# Patient Record
Sex: FEMALE | Race: WHITE | Employment: FULL TIME | URBAN - METROPOLITAN AREA
[De-identification: names, ages, dates, MRNs, and addresses within clinical notes are randomized per-mention and may not be internally consistent; named-entity substitution may affect disease eponyms.]

---

## 2019-01-10 ENCOUNTER — HOSPITAL ENCOUNTER (OUTPATIENT)
Dept: PHYSICAL THERAPY | Age: 19
Setting detail: THERAPIES SERIES
Discharge: HOME | End: 2019-01-10
Payer: COMMERCIAL

## 2019-01-10 PROCEDURE — 97161 PT EVAL LOW COMPLEX 20 MIN: CPT

## 2019-01-10 PROCEDURE — 97110 THERAPEUTIC EXERCISES: CPT

## 2019-01-10 PROCEDURE — 97140 MANUAL THERAPY 1/> REGIONS: CPT

## 2019-01-10 ASSESSMENT — PAIN DESCRIPTION - LOCATION: LOCATION: NECK

## 2019-01-10 ASSESSMENT — PAIN DESCRIPTION - PROGRESSION: CLINICAL_PROGRESSION: GRADUALLY WORSENING

## 2019-01-10 ASSESSMENT — PAIN - FUNCTIONAL ASSESSMENT: PAIN_FUNCTIONAL_ASSESSMENT: PREVENTS OR INTERFERES WITH ALL ACTIVE AND SOME PASSIVE ACTIVITIES

## 2019-01-10 ASSESSMENT — PAIN DESCRIPTION - ORIENTATION: ORIENTATION: RIGHT

## 2019-01-10 ASSESSMENT — PAIN DESCRIPTION - DESCRIPTORS: DESCRIPTORS: BURNING;SHOOTING

## 2019-01-14 ENCOUNTER — HOSPITAL ENCOUNTER (OUTPATIENT)
Dept: PHYSICAL THERAPY | Age: 19
Setting detail: THERAPIES SERIES
Discharge: HOME | End: 2019-01-14
Payer: COMMERCIAL

## 2019-01-14 PROCEDURE — 97140 MANUAL THERAPY 1/> REGIONS: CPT

## 2019-01-14 PROCEDURE — 97110 THERAPEUTIC EXERCISES: CPT

## 2019-01-17 ENCOUNTER — HOSPITAL ENCOUNTER (OUTPATIENT)
Dept: PHYSICAL THERAPY | Age: 19
Setting detail: THERAPIES SERIES
Discharge: HOME | End: 2019-01-17
Payer: COMMERCIAL

## 2019-01-17 PROCEDURE — 97140 MANUAL THERAPY 1/> REGIONS: CPT

## 2019-01-17 PROCEDURE — 97110 THERAPEUTIC EXERCISES: CPT

## 2019-01-21 ENCOUNTER — HOSPITAL ENCOUNTER (OUTPATIENT)
Dept: PHYSICAL THERAPY | Age: 19
Setting detail: THERAPIES SERIES
Discharge: HOME | End: 2019-01-21
Payer: COMMERCIAL

## 2019-01-21 PROCEDURE — 97140 MANUAL THERAPY 1/> REGIONS: CPT

## 2019-01-21 PROCEDURE — 97110 THERAPEUTIC EXERCISES: CPT

## 2019-01-24 ENCOUNTER — HOSPITAL ENCOUNTER (OUTPATIENT)
Dept: PHYSICAL THERAPY | Age: 19
Setting detail: THERAPIES SERIES
Discharge: HOME | End: 2019-01-24
Payer: COMMERCIAL

## 2019-01-29 ENCOUNTER — HOSPITAL ENCOUNTER (OUTPATIENT)
Dept: PHYSICAL THERAPY | Age: 19
Setting detail: THERAPIES SERIES
Discharge: HOME | End: 2019-01-29
Payer: COMMERCIAL

## 2019-01-29 PROCEDURE — 97110 THERAPEUTIC EXERCISES: CPT

## 2019-01-29 PROCEDURE — 97140 MANUAL THERAPY 1/> REGIONS: CPT

## 2019-01-31 ENCOUNTER — HOSPITAL ENCOUNTER (OUTPATIENT)
Dept: PHYSICAL THERAPY | Age: 19
Setting detail: THERAPIES SERIES
Discharge: HOME | End: 2019-01-31
Payer: COMMERCIAL

## 2019-02-05 ENCOUNTER — HOSPITAL ENCOUNTER (OUTPATIENT)
Dept: PHYSICAL THERAPY | Age: 19
Setting detail: THERAPIES SERIES
Discharge: HOME | End: 2019-02-05
Payer: COMMERCIAL

## 2019-02-05 PROCEDURE — 97140 MANUAL THERAPY 1/> REGIONS: CPT

## 2019-02-05 PROCEDURE — 97110 THERAPEUTIC EXERCISES: CPT

## 2019-02-11 ENCOUNTER — HOSPITAL ENCOUNTER (OUTPATIENT)
Dept: PHYSICAL THERAPY | Age: 19
Setting detail: THERAPIES SERIES
Discharge: HOME | End: 2019-02-11
Payer: COMMERCIAL

## 2019-02-11 PROCEDURE — 97140 MANUAL THERAPY 1/> REGIONS: CPT

## 2019-02-11 PROCEDURE — 97110 THERAPEUTIC EXERCISES: CPT

## 2019-02-18 ENCOUNTER — HOSPITAL ENCOUNTER (OUTPATIENT)
Dept: PHYSICAL THERAPY | Age: 19
Setting detail: THERAPIES SERIES
Discharge: HOME | End: 2019-02-18
Payer: COMMERCIAL

## 2019-02-18 PROCEDURE — 97140 MANUAL THERAPY 1/> REGIONS: CPT

## 2019-02-18 PROCEDURE — 97110 THERAPEUTIC EXERCISES: CPT

## 2019-02-20 ENCOUNTER — HOSPITAL ENCOUNTER (OUTPATIENT)
Dept: PHYSICAL THERAPY | Age: 19
Setting detail: THERAPIES SERIES
Discharge: HOME | End: 2019-02-20
Payer: COMMERCIAL

## 2019-02-20 PROCEDURE — 97110 THERAPEUTIC EXERCISES: CPT

## 2019-02-20 PROCEDURE — 97140 MANUAL THERAPY 1/> REGIONS: CPT

## 2019-02-25 ENCOUNTER — HOSPITAL ENCOUNTER (OUTPATIENT)
Dept: PHYSICAL THERAPY | Age: 19
Setting detail: THERAPIES SERIES
Discharge: HOME | End: 2019-02-25
Payer: COMMERCIAL

## 2019-02-25 PROCEDURE — 97110 THERAPEUTIC EXERCISES: CPT

## 2019-02-25 PROCEDURE — 97140 MANUAL THERAPY 1/> REGIONS: CPT

## 2019-03-06 ENCOUNTER — HOSPITAL ENCOUNTER (OUTPATIENT)
Dept: PHYSICAL THERAPY | Age: 19
Setting detail: THERAPIES SERIES
Discharge: HOME | End: 2019-03-06
Payer: COMMERCIAL

## 2019-03-06 PROCEDURE — 97140 MANUAL THERAPY 1/> REGIONS: CPT

## 2019-03-06 PROCEDURE — 97110 THERAPEUTIC EXERCISES: CPT

## 2019-03-11 ENCOUNTER — HOSPITAL ENCOUNTER (OUTPATIENT)
Dept: PHYSICAL THERAPY | Age: 19
Setting detail: THERAPIES SERIES
Discharge: HOME | End: 2019-03-11
Payer: COMMERCIAL

## 2019-03-11 PROCEDURE — 97140 MANUAL THERAPY 1/> REGIONS: CPT

## 2019-03-11 PROCEDURE — 97110 THERAPEUTIC EXERCISES: CPT

## 2019-03-20 ENCOUNTER — HOSPITAL ENCOUNTER (OUTPATIENT)
Dept: PHYSICAL THERAPY | Age: 19
Setting detail: THERAPIES SERIES
Discharge: HOME | End: 2019-03-20
Payer: COMMERCIAL

## 2019-03-20 PROCEDURE — 97110 THERAPEUTIC EXERCISES: CPT

## 2019-03-20 PROCEDURE — 97140 MANUAL THERAPY 1/> REGIONS: CPT

## 2019-06-11 ENCOUNTER — HOSPITAL ENCOUNTER (EMERGENCY)
Age: 19
Discharge: HOME | End: 2019-06-11
Attending: EMERGENCY MEDICINE
Payer: COMMERCIAL

## 2019-06-11 VITALS
RESPIRATION RATE: 17 BRPM | TEMPERATURE: 97.6 F | DIASTOLIC BLOOD PRESSURE: 57 MMHG | OXYGEN SATURATION: 100 % | HEIGHT: 63 IN | SYSTOLIC BLOOD PRESSURE: 123 MMHG | WEIGHT: 154.98 LBS | HEART RATE: 78 BPM | BODY MASS INDEX: 27.46 KG/M2

## 2019-06-11 DIAGNOSIS — R51.9 HEADACHE DISORDER: Primary | ICD-10-CM

## 2019-06-11 PROCEDURE — 96374 THER/PROPH/DIAG INJ IV PUSH: CPT

## 2019-06-11 PROCEDURE — 2580000003 HC RX 258: Performed by: EMERGENCY MEDICINE

## 2019-06-11 PROCEDURE — 96375 TX/PRO/DX INJ NEW DRUG ADDON: CPT

## 2019-06-11 PROCEDURE — 6360000002 HC RX W HCPCS: Performed by: EMERGENCY MEDICINE

## 2019-06-11 PROCEDURE — 96361 HYDRATE IV INFUSION ADD-ON: CPT

## 2019-06-11 PROCEDURE — 99283 EMERGENCY DEPT VISIT LOW MDM: CPT

## 2019-06-11 RX ORDER — DIPHENHYDRAMINE HYDROCHLORIDE 50 MG/ML
25 INJECTION INTRAMUSCULAR; INTRAVENOUS ONCE
Status: COMPLETED | OUTPATIENT
Start: 2019-06-11 | End: 2019-06-11

## 2019-06-11 RX ORDER — PROCHLORPERAZINE EDISYLATE 5 MG/ML
10 INJECTION INTRAMUSCULAR; INTRAVENOUS ONCE
Status: COMPLETED | OUTPATIENT
Start: 2019-06-11 | End: 2019-06-11

## 2019-06-11 RX ORDER — 0.9 % SODIUM CHLORIDE 0.9 %
1000 INTRAVENOUS SOLUTION INTRAVENOUS ONCE
Status: COMPLETED | OUTPATIENT
Start: 2019-06-11 | End: 2019-06-11

## 2019-06-11 RX ADMIN — SODIUM CHLORIDE 1000 ML: 9 INJECTION, SOLUTION INTRAVENOUS at 19:44

## 2019-06-11 RX ADMIN — DIPHENHYDRAMINE HYDROCHLORIDE 25 MG: 50 INJECTION, SOLUTION INTRAMUSCULAR; INTRAVENOUS at 19:44

## 2019-06-11 RX ADMIN — PROCHLORPERAZINE EDISYLATE 10 MG: 5 INJECTION INTRAMUSCULAR; INTRAVENOUS at 19:45

## 2019-06-11 ASSESSMENT — PAIN DESCRIPTION - LOCATION
LOCATION: HEAD

## 2019-06-11 ASSESSMENT — PAIN DESCRIPTION - PAIN TYPE
TYPE: ACUTE PAIN

## 2019-06-11 ASSESSMENT — PAIN DESCRIPTION - PROGRESSION
CLINICAL_PROGRESSION: GRADUALLY IMPROVING
CLINICAL_PROGRESSION: NOT CHANGED
CLINICAL_PROGRESSION: GRADUALLY IMPROVING

## 2019-06-11 ASSESSMENT — PAIN DESCRIPTION - DESCRIPTORS
DESCRIPTORS: THROBBING
DESCRIPTORS: STABBING;THROBBING

## 2019-06-11 ASSESSMENT — PAIN DESCRIPTION - FREQUENCY
FREQUENCY: CONTINUOUS
FREQUENCY: CONTINUOUS

## 2019-06-11 ASSESSMENT — PAIN DESCRIPTION - ONSET: ONSET: PROGRESSIVE

## 2019-06-11 ASSESSMENT — PAIN SCALES - GENERAL
PAINLEVEL_OUTOF10: 5
PAINLEVEL_OUTOF10: 8
PAINLEVEL_OUTOF10: 6

## 2019-06-11 ASSESSMENT — PAIN - FUNCTIONAL ASSESSMENT: PAIN_FUNCTIONAL_ASSESSMENT: PREVENTS OR INTERFERES SOME ACTIVE ACTIVITIES AND ADLS

## 2019-06-11 ASSESSMENT — PAIN DESCRIPTION - DIRECTION
RADIATING_TOWARDS: NON RADIATING
RADIATING_TOWARDS: NON RADIATING

## 2019-06-11 ASSESSMENT — PAIN DESCRIPTION - ORIENTATION
ORIENTATION: OTHER (COMMENT)
ORIENTATION: RIGHT;OTHER (COMMENT)

## 2019-06-11 NOTE — ED PROVIDER NOTES
eMERGENCY dEPARTMENT eNCOUnter      [facility]    Chief Complaint   Patient presents with    Headache     patient states migraine since 10 am, also with nausea & vomiting associated with it. Took ibuprofen for it. OPHELIA De Los Santos is a 20 y.o. female who presents with headache that started at 10:00 this morning. Patient has a history of chronic recurrent headaches that she calls migraines. She has seen a neurologist in the past.  She normally takes ibuprofen which seems to help but it did not help today. She has nausea and vomiting associated with it as well. She is never had any type of cancer, no trauma, no fever or neck stiffness, and no recent travel outside the [facility]. She never had a intracranial injury or surgery and she is not anticoagulated. She states that the headache came on gradually and then got progressively worse and it is global and it is worse in with light and opening her eyes. She states she has some numbness around her mouth as well which is typical.    PAST MEDICAL HISTORY    Past Medical History:   Diagnosis Date    Bipolar 1 disorder ([facility] [address]     Hx of blood clots     after achilles tendon surgery    Intermittent explosive disorder     Memory loss     forgetful, poor memory, short and long term     Mental disorder     Bipolar/Adhd/ IDD    Migraine     Migraines     Osteopenia     Sleep apnea     prior to tonsillectomy and adnoidectomy at age 1    Vitamin D deficiency        SURGICAL HISTORY    Past Surgical History:   Procedure Laterality Date    ACHILLES TENDON SURGERY Bilateral     ANKLE SURGERY Right Rt x 4 total surgeries and Lt x2 surgeries     Right ankle removed scar tissue and later had to have the hardware removed and replaced.       ANKLE SURGERY  2013    x3    OTHER SURGICAL HISTORY Bilateral     Bilateral ankle implants     OTHER SURGICAL HISTORY Right 2/2013    Right arm surgery to repair s/p fracture w/ plate and pin to reconstruct inadequate healing     OTHER SURGICAL HISTORY  4/26/13    REMOVAL OF DEEP HARDWARE/FOREIGN BODY BILATERAL FEET    OTHER SURGICAL HISTORY      plates and pins placed/remover 2014 in R arm     TONSILLECTOMY      TONSILLECTOMY AND ADENOIDECTOMY         CURRENT MEDICATIONS    Current Outpatient Rx   Medication Sig Dispense Refill    ibuprofen (ADVIL;MOTRIN) 600 MG tablet Take 1 tablet by mouth every 6 hours as needed for Pain 60 tablet 0    ibuprofen (ADVIL;MOTRIN) 600 MG tablet Take 600 mg by mouth every 6 hours as needed. Indications: migraines and arm pain      amphetamine-dextroamphetamine (ADDERALL XR) 25 MG XR capsule Take 25 mg by mouth every morning.  divalproex (DEPAKOTE ER) 250 MG ER tablet Take 750 mg by mouth 2 times daily. Indications: migraines      Melatonin 3 MG TABS Take 3 mg by mouth daily. Takes about 3-4 times per wk to assist with sleep      SUMAtriptan (IMITREX) 100 MG tablet Take 100 mg by mouth once as needed. Indications: migraines      multivitamin (THERAGRAN) per tablet Take 1 tablet by mouth daily.          ALLERGIES    Allergies   Allergen Reactions    No Known Allergies      Other reaction(s):    NO KNOWN DRUG ALLERGIES       FAMILY HISTORY    Family History   Problem Relation Age of Onset    Arthritis Mother     Asthma Mother     Diabetes Mother     High Cholesterol Mother     Depression Sister     Mental Illness Sister     High Blood Pressure Maternal Aunt     Diabetes Maternal Grandmother     Heart Disease Maternal Grandmother     High Blood Pressure Maternal Grandmother     High Cholesterol Maternal Grandmother     Arthritis Maternal Grandmother     Diabetes Paternal Grandmother     Kidney Disease Paternal Grandmother     Hearing Loss Paternal Grandmother     Heart Disease Paternal Grandmother     High Blood Pressure Paternal Grandmother     Arthritis Paternal Grandfather     Diabetes Paternal Grandfather     Heart Disease Paternal Justin Persons High Blood Pressure Paternal Grandfather     Kidney Disease Paternal Grandfather     Heart Disease Maternal Cousin     Other Maternal Cousin     Birth Defects Paternal Cousin        SOCIAL HISTORY    Social History     Socioeconomic History    Marital status: Single     Spouse name: None    Number of children: None    Years of education: None    Highest education level: None   Occupational History    None   Social Needs    Financial resource strain: None    Food insecurity:     Worry: None     Inability: None    Transportation needs:     Medical: None     Non-medical: None   Tobacco Use    Smoking status: Never Smoker    Smokeless tobacco: Never Used   Substance and Sexual Activity    Alcohol use: No    Drug use: No    Sexual activity: Never     Partners: Male   Lifestyle    Physical activity:     Days per week: None     Minutes per session: None    Stress: None   Relationships    Social connections:     Talks on phone: None     Gets together: None     Attends [employer] service: None     Active member of club or organization: None     Attends meetings of clubs or organizations: None     Relationship status: None    Intimate partner violence:     Fear of current or ex partner: None     Emotionally abused: None     Physically abused: None     Forced sexual activity: None   Other Topics Concern    None   Social History Narrative    ** Merged History Encounter **            REVIEW OF SYSTEMS    Constitutional:  Denies fever, chills, weight loss or weakness   Eyes:  Denies photophobia or discharge   HENT:  Denies sore throat or ear pain   Respiratory:  Denies cough or shortness of breath   Cardiovascular:  Denies chest pain, palpitations or swelling   GI:  Denies abdominal pain, nausea, vomiting, or diarrhea   Musculoskeletal:  Denies back pain   Skin:  Denies rash   Neurologic: Headache   endocrine:  Denies polyuria or polydypsia   Lymphatic:  Denies swollen glands   Psychiatric:  Denies patient  After treatment, she stated that the pain was almost completely gone. She is awake and alert with no focal neurologic deficit. I think that she can follow-up with her neurologist or at the very least primary care. Return for worsening headache or weakness in extremities or fever of 101 or greater. She voices understanding  FINAL IMPRESSION    1.  Headache disorder             Tahira Case MD  06/11/19 2047

## 2019-06-11 NOTE — ED TRIAGE NOTES
Patient presents as walk in patient with her mother with c/o headache, nausea and vomiting that started around 10:00 today. Patient vomiting during triage assessment. Patient's mother states patient had history of migraines as a child, but has not had one for several years. Patient took ibuprofen around 12:00, then again at 6:00 but vomited immediately following 6:00 dose. Patient also states feeling numbness and tingling in right arm and mouth and ringing in ears. Also c/o light sensitivity. Patient is awake, alert, oriented, respirations easy & regular, skin w/d, MMM & pink, cap refill brisk. Patient's mother answered most of patient's triage intake. Side rails up on bed, call light near, lights in room dimmed. Dr. Maria Alejandra Tamez in to see patient.

## 2019-06-12 NOTE — ED NOTES
Patient resting quietly in darkened room, states pain is starting to get better. Denies further episodes of vomiting. Patient's mother at bedside.       Shawnee Rodrigez RN  06/11/19 6570

## 2019-06-12 NOTE — ED NOTES
Patient states no vomiting since medication administration. States pain is now a 5/10 and that she wants to go home and go to bed. Patient states she is satisfied with pain level at this time and just wants to sleep. Discharge instructions reviewed with patient and verbalized understanding, denies further questions. Successful teach back occurred. Discharged ambulatory with steady gait to ED junior. Written discharge instructions provided to patient.       Angela Johns RN  06/11/19 4777

## 2021-06-19 ENCOUNTER — APPOINTMENT (OUTPATIENT)
Dept: GENERAL RADIOLOGY | Age: 21
End: 2021-06-19
Payer: COMMERCIAL

## 2021-06-19 ENCOUNTER — HOSPITAL ENCOUNTER (EMERGENCY)
Age: 21
Discharge: HOME | End: 2021-06-19
Attending: EMERGENCY MEDICINE
Payer: COMMERCIAL

## 2021-06-19 VITALS
TEMPERATURE: 98 F | SYSTOLIC BLOOD PRESSURE: 115 MMHG | BODY MASS INDEX: 24.06 KG/M2 | DIASTOLIC BLOOD PRESSURE: 79 MMHG | HEART RATE: 98 BPM | HEIGHT: 63 IN | RESPIRATION RATE: 16 BRPM | OXYGEN SATURATION: 98 % | WEIGHT: 135.8 LBS

## 2021-06-19 DIAGNOSIS — S62.316A CLOSED DISPLACED FRACTURE OF BASE OF FIFTH METACARPAL BONE OF RIGHT HAND, INITIAL ENCOUNTER: Primary | ICD-10-CM

## 2021-06-19 PROCEDURE — 29125 APPL SHORT ARM SPLINT STATIC: CPT

## 2021-06-19 PROCEDURE — 73130 X-RAY EXAM OF HAND: CPT

## 2021-06-19 PROCEDURE — 99285 EMERGENCY DEPT VISIT HI MDM: CPT

## 2021-06-19 RX ORDER — IBUPROFEN 600 MG/1
600 TABLET ORAL EVERY 6 HOURS PRN
Qty: 30 TABLET | Refills: 0 | Status: SHIPPED | OUTPATIENT
Start: 2021-06-19 | End: 2021-06-19 | Stop reason: SDUPTHER

## 2021-06-19 RX ORDER — IBUPROFEN 600 MG/1
600 TABLET ORAL EVERY 6 HOURS PRN
Qty: 30 TABLET | Refills: 0 | Status: SHIPPED | OUTPATIENT
Start: 2021-06-19 | End: 2021-10-28

## 2021-06-19 ASSESSMENT — PAIN DESCRIPTION - FREQUENCY: FREQUENCY: CONTINUOUS

## 2021-06-19 ASSESSMENT — PAIN SCALES - GENERAL
PAINLEVEL_OUTOF10: 4
PAINLEVEL_OUTOF10: 6

## 2021-06-19 ASSESSMENT — PAIN DESCRIPTION - ORIENTATION
ORIENTATION: RIGHT
ORIENTATION: RIGHT

## 2021-06-19 ASSESSMENT — PAIN DESCRIPTION - DESCRIPTORS
DESCRIPTORS: ACHING
DESCRIPTORS: ACHING

## 2021-06-19 ASSESSMENT — PAIN DESCRIPTION - LOCATION
LOCATION: HAND
LOCATION: HAND

## 2021-06-19 ASSESSMENT — PAIN DESCRIPTION - PAIN TYPE
TYPE: ACUTE PAIN
TYPE: ACUTE PAIN

## 2021-06-19 ASSESSMENT — PAIN - FUNCTIONAL ASSESSMENT: PAIN_FUNCTIONAL_ASSESSMENT: 0-10

## 2021-06-19 NOTE — ED PROVIDER NOTES
Right 2/2013    Right arm surgery to repair s/p fracture w/ plate and pin to reconstruct inadequate healing     OTHER SURGICAL HISTORY  4/26/13    REMOVAL OF DEEP HARDWARE/FOREIGN BODY BILATERAL FEET    OTHER SURGICAL HISTORY      plates and pins placed/remover 2014 in R arm     TONSILLECTOMY      TONSILLECTOMY AND ADENOIDECTOMY         CURRENT MEDICATIONS       Current Discharge Medication List          ALLERGIES     No known allergies    FAMILY HISTORY           Problem Relation Age of Onset    Arthritis Mother     Asthma Mother     Diabetes Mother     High Cholesterol Mother     Depression Sister     Mental Illness Sister     High Blood Pressure Maternal Aunt     Diabetes Maternal Grandmother     Heart Disease Maternal Grandmother     High Blood Pressure Maternal Grandmother     High Cholesterol Maternal Grandmother     Arthritis Maternal Grandmother     Diabetes Paternal Grandmother     Kidney Disease Paternal Grandmother     Hearing Loss Paternal Grandmother     Heart Disease Paternal Grandmother     High Blood Pressure Paternal Grandmother     Arthritis Paternal Grandfather     Diabetes Paternal Grandfather     Heart Disease Paternal Grandfather     High Blood Pressure Paternal Grandfather     Kidney Disease Paternal Grandfather     Heart Disease Maternal Cousin     Other Maternal Cousin     Birth Defects Paternal Cousin      Family Status   Relation Name Status    Mother  (Not Specified)    Sister  (Not Specified)    MAunt  (Not Specified)    MGM  (Not Specified)    PGM  (Not Specified)    PGF  (Not Specified)    MCousin  (Not Specified)    PCousin  (Not Specified)        SOCIAL HISTORY      reports that she has been smoking. She has been smoking about 0.25 packs per day. She has never used smokeless tobacco. She reports current alcohol use. She reports that she does not use drugs.     PHYSICAL EXAM    (up to 7 for level 4, 8 or more for level 5)     ED Triage Vitals   BP Temp Temp src Pulse Resp SpO2 Height Weight   -- -- -- -- -- -- -- --       General: Alert white female no acute distress. Musculoskeletal: Right forearm is nontender without deformity. Right wrist is nontender without swelling. Full range of motion without pain. Right hand shows mild swelling and tenderness the area of the fifth metacarpal and fifth MCP joint extending out onto the proximal phalanx of the fifth digit. The remainder the hand is nontender. She has full range of motion of all digits including intact superficial deep flexor tendon function, intact extensor tendon function. There is no deformity or rotatory malalignment. Intact distal neurovascular status. Skin: Warm and dry, good turgor. No bruising, lacerations, or abrasions to the right upper extremity. Neuro: Intact motor function sensation right upper extremity. DIAGNOSTIC RESULTS     RADIOLOGY:   Non-plain film images such as CT, Ultrasound and MRI are read by the radiologist. Plain radiographic images are visualized and preliminarily interpreted by Haris Rosario MD with the below findings:      Interpretation per the Radiologist below, if available at the time of this note:    XR HAND RIGHT (MIN 3 VIEWS)   Final Result   No acute osseous abnormality of the right hand. Chronic ulnar styloid   fracture. LABS:  Labs Reviewed - No data to display    All other labs were within normal range or not returned as of this dictation. EMERGENCY DEPARTMENT COURSE and DIFFERENTIAL DIAGNOSIS/MDM:   Vitals:    Vitals:    06/19/21 0225   BP: 115/79   Pulse: 98   Resp: 16   Temp: 98 °F (36.7 °C)   TempSrc: Oral   SpO2: 98%   Weight: 135 lb 12.9 oz (61.6 kg)   Height: 5' 3\" (1.6 m)       This patient has some pain and tenderness at the fifth metacarpal area particularly at the base.   On my review and a single view she has a fracture that goes to the articular surface of the base of the metacarpal.  The radiologist is reading
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
## 2021-06-24 ENCOUNTER — OFFICE VISIT (OUTPATIENT)
Dept: ORTHOPEDIC SURGERY | Age: 21
End: 2021-06-24
Payer: COMMERCIAL

## 2021-06-24 ENCOUNTER — TELEPHONE (OUTPATIENT)
Dept: ORTHOPEDIC SURGERY | Age: 21
End: 2021-06-24

## 2021-06-24 VITALS
HEART RATE: 73 BPM | HEIGHT: 63 IN | DIASTOLIC BLOOD PRESSURE: 69 MMHG | BODY MASS INDEX: 23.57 KG/M2 | SYSTOLIC BLOOD PRESSURE: 108 MMHG | WEIGHT: 133 LBS

## 2021-06-24 DIAGNOSIS — S52.611A CLOSED DISPLACED FRACTURE OF STYLOID PROCESS OF RIGHT ULNA, INITIAL ENCOUNTER: ICD-10-CM

## 2021-06-24 DIAGNOSIS — S69.81XA INJURY OF TRIANGULAR FIBROCARTILAGE COMPLEX (TFCC) OF RIGHT WRIST, INITIAL ENCOUNTER: Primary | ICD-10-CM

## 2021-06-24 PROCEDURE — L3809 WHFO W/O JOINTS PRE OTS: HCPCS | Performed by: PHYSICIAN ASSISTANT

## 2021-06-24 PROCEDURE — 99203 OFFICE O/P NEW LOW 30 MIN: CPT | Performed by: PHYSICIAN ASSISTANT

## 2021-06-24 RX ORDER — TRAMADOL HYDROCHLORIDE 50 MG/1
50 TABLET ORAL EVERY 4 HOURS PRN
Qty: 18 TABLET | Refills: 0 | Status: SHIPPED | OUTPATIENT
Start: 2021-06-24 | End: 2021-06-27
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
## 2021-06-24 NOTE — LETTER
[facility]  [address] [address]  Phone: [phone]  Fax: [phone]    Samir Frausto        June 24, 2021     Patient: Violet Feng   YOB: 2000   Date of Visit: 6/24/2021       To Whom It May Concern: It is my medical opinion that Amy Wright may return to light duty immediately with the following restrictions: may work in the brace for the next 4 weeks (7.22.2021). If you have any questions or concerns, please don't hesitate to call.     Sincerely,          Kelby Lorenzo PA-C

## 2021-07-15 ENCOUNTER — OFFICE VISIT (OUTPATIENT)
Dept: ORTHOPEDIC SURGERY | Age: 21
End: 2021-07-15
Payer: COMMERCIAL

## 2021-07-15 VITALS
BODY MASS INDEX: 23.57 KG/M2 | SYSTOLIC BLOOD PRESSURE: 116 MMHG | WEIGHT: 133 LBS | HEART RATE: 100 BPM | DIASTOLIC BLOOD PRESSURE: 77 MMHG | HEIGHT: 63 IN

## 2021-07-15 DIAGNOSIS — S69.81XD INJURY OF TRIANGULAR FIBROCARTILAGE COMPLEX (TFCC) OF RIGHT WRIST, SUBSEQUENT ENCOUNTER: Primary | ICD-10-CM

## 2021-07-15 PROCEDURE — 99213 OFFICE O/P EST LOW 20 MIN: CPT | Performed by: PHYSICIAN ASSISTANT

## 2021-07-15 PROCEDURE — 20605 DRAIN/INJ JOINT/BURSA W/O US: CPT | Performed by: PHYSICIAN ASSISTANT

## 2021-07-15 NOTE — LETTER
[facility]  [facility] [address] [address] [address]  Phone: [phone]  Fax: [phone]    Janelle Almanza MD        July 15, 2021     Patient: Nathanael Edwards   YOB: 2000   Date of Visit: 7/15/2021       To Whom It May Concern: It is my medical opinion that Janeth Pedersen Had an injection into her wrist today. She is unable to work until July 19, 2021. At that point I believe she will be able to work without restriction. If you have any questions or concerns, please don't hesitate to call.     Sincerely,        Anya Espinoza PA-C

## 2021-07-17 NOTE — PROGRESS NOTES
This dictation was done with Dragon dictation and may contain mechanical errors related to translation. I have today reviewed with Leon Rogel the clinically relevant, past medical history, medications, allergies, family history, social history, and Review Of Systems form the patients most recent history form & I have documented any details relevant to today's presenting complaints in my history below. Ms. Ronnie Underwood's self-reported past medical history, medications, allergies, family history, social history, and Review Of Systems form has been scanned into the chart under the \"Media\" tab. Subjective:  Leon Rogel is a 24 y.o. who is here in follow-up for her right hand. This is a pretty tough 26-year-old young lady who works in the meat packing progression mainly doing sausages. She developed TFCC has had some popping and grinding of her right hand on the lateral aspect. This was originally injured back around 6/19/2020 I had seen her and placed her into a splint. She has 2 out of 10 pain and has not been able to work her regular occupation. This is a follow-up visit. Patient Active Problem List   Diagnosis    Normal labor           Current Outpatient Medications on File Prior to Visit   Medication Sig Dispense Refill    ibuprofen (ADVIL;MOTRIN) 600 MG tablet Take 1 tablet by mouth every 6 hours as needed for Pain (Patient not taking: Reported on 6/24/2021) 30 tablet 0     No current facility-administered medications on file prior to visit. Objective:   Blood pressure 116/77, pulse 100, height 5' 3\" (1.6 m), weight 133 lb (60.3 kg), unknown if currently breastfeeding. Upon examination today is a pleasant 26-year-old female no acute distress she is alert and oriented x3 she has good symmetric motion to the shoulder and the neck she has a history of some scapular stabilizer weakness and we talked about some strengthening exercises for her shoulder.   Right hand has exquisite tenderness over the TFCC and more pain with making a fist and going into ulnar deviation. This is consistent with TFCC. Neuro exam grossly intact both lower extremities. Intact sensation to light touch. Motor exam 4+ to 5/5 in all major motor groups. Negative Miller's sign. Skin is warm, dry and intact with out erythema or significant increased temperature around the knee joint(s). There are no cutaneous lesions or lymphadenopathy present. X-RAYS:  No x-rays were taken today. Assessment:  Right hand chronic TFCC    Plan:  During today's visit, there was approximately 25 minutes of face-to-face discussion in regards to the patient's current condition and treatment options. More than 50 % of the time was counseling and coordination of care as indicated above. Talked about short and long-term expectations and I think that this will gradually resolve. For her and her working schedule and the current amount of disability she did elect for cortisone injection today. 1/2 cc Kenalog and 1 cc of Marcaine were injected in the appropriate sterile fashion into the right hand TFCC area. She tolerated well. I have her gradually wean off her wrist brace and I suspect that she will be able to return without restriction next week.   She was given a note to the extent tightness in 3 to 4 weeks if she is not feeling significant      PROCEDURE NOTE:   Injection of cortisone for TFCC      They will schedule a follow up in 4 weeks as needed

## 2021-08-05 ENCOUNTER — OFFICE VISIT (OUTPATIENT)
Dept: ORTHOPEDIC SURGERY | Age: 21
End: 2021-08-05
Payer: COMMERCIAL

## 2021-08-05 VITALS — BODY MASS INDEX: 23.57 KG/M2 | RESPIRATION RATE: 16 BRPM | HEIGHT: 63 IN | WEIGHT: 133 LBS

## 2021-08-05 DIAGNOSIS — S69.81XA INJURY OF TRIANGULAR FIBROCARTILAGE COMPLEX (TFCC) OF RIGHT WRIST, INITIAL ENCOUNTER: Primary | ICD-10-CM

## 2021-08-05 PROCEDURE — 99213 OFFICE O/P EST LOW 20 MIN: CPT | Performed by: PHYSICIAN ASSISTANT

## 2021-08-05 RX ORDER — METHYLPREDNISOLONE 4 MG/1
TABLET ORAL
Qty: 1 KIT | Refills: 0 | Status: SHIPPED | OUTPATIENT
Start: 2021-08-05
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
## 2021-10-28 ENCOUNTER — OFFICE VISIT (OUTPATIENT)
Dept: ORTHOPEDIC SURGERY | Age: 21
End: 2021-10-28
Payer: COMMERCIAL

## 2021-10-28 VITALS — HEIGHT: 63 IN | BODY MASS INDEX: 23.57 KG/M2 | WEIGHT: 133 LBS

## 2021-10-28 DIAGNOSIS — S69.81XD INJURY OF TRIANGULAR FIBROCARTILAGE COMPLEX (TFCC) OF RIGHT WRIST, SUBSEQUENT ENCOUNTER: Primary | ICD-10-CM

## 2021-10-28 PROCEDURE — 99213 OFFICE O/P EST LOW 20 MIN: CPT | Performed by: PHYSICIAN ASSISTANT

## 2021-10-28 RX ORDER — MELOXICAM 7.5 MG/1
7.5 TABLET ORAL DAILY
COMMUNITY
Start: 2021-10-15

## 2021-11-08 ENCOUNTER — OFFICE VISIT (OUTPATIENT)
Dept: ORTHOPEDIC SURGERY | Age: 21
End: 2021-11-08
Payer: COMMERCIAL

## 2021-11-08 VITALS — RESPIRATION RATE: 15 BRPM | BODY MASS INDEX: 23.57 KG/M2 | HEIGHT: 63 IN | WEIGHT: 133 LBS

## 2021-11-08 DIAGNOSIS — G89.29 WRIST PAIN, CHRONIC, RIGHT: Primary | ICD-10-CM

## 2021-11-08 DIAGNOSIS — M25.531 WRIST PAIN, CHRONIC, RIGHT: Primary | ICD-10-CM

## 2021-11-08 PROCEDURE — 99213 OFFICE O/P EST LOW 20 MIN: CPT | Performed by: ORTHOPAEDIC SURGERY

## 2021-11-08 NOTE — PROGRESS NOTES
Ms. Ivonne Hudson is a 24 y.o. right handed woman  who is seen today in Hand Surgical Consultation at the request of [facility]. She presents today regarding right Wrist symptoms which have been present for approximately 10 years. A history of antecedent trauma or injury is Present with a significant fracture of the right  wrist and forearm axis at the age of 14 years treated with ORIF of the forearm. She reports symptoms to include  moderate pain and catching located in the Ulnar aspect of the wrist, no tenderness of the remaining fingers, hand, or elbow. She notes today, no neurologic symptoms in the Whole Hand. Symptoms show no change over time. Previous treatment has included conservative measures, OTC medication and splinting of the right wrist.  She does not claim relation of her symptoms to her required work activities. She has not undergone any testing. I have today reviewed with Ivonne Hudson the clinically relevant, past medical history, medications, allergies,  family history, social history, and Review Of Systems & I have documented any details relevant to today's presenting complaints in my history above. Ms. Kelsey Underwood's self-reported past medical history, medications, allergies,  family history, social history, and Review Of Systems have been scanned into the chart under the \"Media\" tab. Physical Exam:  Ms. Kelsey Underwood's most recent vitals:  Vitals  Resp: 15  Height: 5' 3\" (160 cm)  Weight: 133 lb (60.3 kg)    She is well nourished, oriented to person, place & time. She demonstrates appropriate mood and affect as well as normal gait and station. Skin: Normal in appearance, Normal Color and Free of Lesions Bilaterally   Digital range of motion is Full and equal bilaterally otherwise normal bilaterally  Wrist range of motion is full and equal bilateral otherwise normal bilaterally  Sensation is subjectively normal in the Whole Hand.   All other digits are normally sensate bilaterally  Vascular examination reveals normal and good capillary refill bilaterally. There is no acute ecchymosis. Swelling is absent in the Ulnar aspect of the wrist.  No other digit or hand bilaterally shows sign of swelling. There is no evidence of gross joint instability bilaterally. Muscular strength is clinically appropriate bilaterally. The base of the hand & wrist are not tender to palpation. There is not clinical evidence of skeletal deformity or mal-rotation. No pain is elicited with palpation of the Ulnar aspect of the wrist. Specifically, the Ulnar Styloid is not tender to palpation, the dorsal margin of the TFCC is not tender to palpation, the DRUJ is not tender to palpation and without effusion. There is mild dorsovolar instability of the DRUJ with relaxation , the Ulnar Intra-Carpal joints are not tender to palpation and without instability, the ECU tendon is not tender to palpation and is not subluxing from it's sub-sheath. Ulnar sided pain is not exacerbated with maximal wrist Ulnar Deviation and is unchanged in full supination, unchanged in neutral rotation, and is unchanged in full pronation. Radiographic Evaluation:  Radiographs, taken From another [de-identified] Office outside of my practice were Personally Reviewed & Interpreted by myself today (3 views of the right wrist were not obtained in [id number]). They demonstrate no evidence of an acute fracture of the distal radius, ulna, or carpal bones (specifically the scaphoid appears normal). 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 The CMC joints & bases of the  Metacarpals do not show displacement, acute injury, or acute change. The Distal Radio-Ulnar Joint reveals no evidence of degenerative change, although there is some chronic incongruence of her . Distal Radio-Ulnar Joint likely related to prior trauma before skeletal maturity. The Ulna shows 0 mm  variance.   There is no evidence of cystic change of the proximal ulnar base of the Lunate. There is not evidence of other static carpal instability of mal-position. Impression:  Ms. Sherri Brooks presents with right Ulnar Sided Wrist pain without significant clinical abnormality and presents requesting further treatment. Plan:  I have had a thorough discussion with Ms. Sherri Brooks regarding the treatment options available for her initially presenting right  Wrist  Distal Radio-Ulnar Joint post-traumatic changes, which is causing her little in the way of symptoms and difficulty. I have outlined for Ms. Sherri Brooks the risk, benefits and consequences of the various treatment modalities, including a reasonable expectation for the long term success of each. We have discussed the fact that further, more aggressive treatment may not necessarily resolve her current symptoms. Based upon our current discussion and a reasonable understating of the options available to her, Ms. Sherri Brooks has selected to proceed with a conservative plan of treatment consisting of: the use of protective splints, activity modification, and the judicious use of over-the-counter anti-inflammatory medications if allowed by her primary care physician. An appropriately sized Removable forearm based Wrist orthosis was offered and declined. Instructions were given regarding splint use and wear as well as suggestions for use of the other modalities were discussed. I have clearly explained to her that the above outlined treatment plan should not be expected to 'cure' her  post-traumatic changes, but we are rather treating the symptoms with which she presents. She has understood that in order to achieve more durable relief of her symptoms and to prevent future worsening or further damage, that definitive surgical treatment would be required.  Ms. Sherri Brooks  voiced an appropriate understanding of our discussion, the options available to her, and of the expectations of her selected  treatment. I have also discussed with Ms. Moreno Victor  the other treatment options available to her  for this condition. We have today selected to proceed with conservative management. She and I have agreed that if our current course of conservative treatment does not prove to be effective over the short term future, that she will schedule a follow-up appointment to discuss and select an alternate course of therapy including possibly injection or surgical treatment. Ms. Moreno Victor has been given a full verbal list of instructions and precautions related to her present condition. I have asked her to followup with me in the office at the prescribed time. She is also specifically requested to call or return to the office sooner if her symptoms change or worsen prior to the next scheduled appointment.